# Patient Record
Sex: FEMALE | Race: WHITE | NOT HISPANIC OR LATINO | Employment: FULL TIME | URBAN - METROPOLITAN AREA
[De-identification: names, ages, dates, MRNs, and addresses within clinical notes are randomized per-mention and may not be internally consistent; named-entity substitution may affect disease eponyms.]

---

## 2020-07-31 ENCOUNTER — TELEPHONE (OUTPATIENT)
Dept: OBSTETRICS AND GYNECOLOGY | Facility: CLINIC | Age: 50
End: 2020-07-31

## 2020-07-31 NOTE — TELEPHONE ENCOUNTER
Spoke with patient. Two pt identifiers confirmed. Notified patient of results of normal cologard. Repeat in 3 years. Patient verbalized understanding.

## 2020-07-31 NOTE — TELEPHONE ENCOUNTER
----- Message from Geena Miranda MD sent at 7/31/2020  1:55 PM CDT -----  Please let patient brodiewo her cologard is negative so she is good for 3 years

## 2020-11-19 ENCOUNTER — PROCEDURE VISIT (OUTPATIENT)
Dept: OBSTETRICS AND GYNECOLOGY | Facility: CLINIC | Age: 50
End: 2020-11-19
Payer: COMMERCIAL

## 2020-11-19 ENCOUNTER — OFFICE VISIT (OUTPATIENT)
Dept: OBSTETRICS AND GYNECOLOGY | Facility: CLINIC | Age: 50
End: 2020-11-19
Payer: COMMERCIAL

## 2020-11-19 VITALS
DIASTOLIC BLOOD PRESSURE: 66 MMHG | WEIGHT: 134.19 LBS | HEART RATE: 77 BPM | HEIGHT: 64 IN | BODY MASS INDEX: 22.91 KG/M2 | SYSTOLIC BLOOD PRESSURE: 109 MMHG

## 2020-11-19 DIAGNOSIS — Z12.31 BREAST CANCER SCREENING BY MAMMOGRAM: ICD-10-CM

## 2020-11-19 DIAGNOSIS — R39.15 URINARY URGENCY: ICD-10-CM

## 2020-11-19 DIAGNOSIS — N95.0 POSTMENOPAUSAL BLEEDING: ICD-10-CM

## 2020-11-19 DIAGNOSIS — Z01.419 ENCOUNTER FOR WELL WOMAN EXAM WITH ROUTINE GYNECOLOGICAL EXAM: Primary | ICD-10-CM

## 2020-11-19 DIAGNOSIS — N95.0 POST-MENOPAUSAL BLEEDING: Primary | ICD-10-CM

## 2020-11-19 DIAGNOSIS — N39.3 STRESS INCONTINENCE: ICD-10-CM

## 2020-11-19 PROBLEM — F90.9 ADULT ATTENTION DEFICIT HYPERACTIVITY DISORDER: Status: ACTIVE | Noted: 2017-10-12

## 2020-11-19 PROBLEM — F32.A DEPRESSIVE DISORDER: Status: ACTIVE | Noted: 2017-10-12

## 2020-11-19 PROBLEM — B96.89 DISORDER ASSOCIATED WITH HELICOBACTER SPECIES: Status: ACTIVE | Noted: 2017-10-12

## 2020-11-19 PROCEDURE — 99396 PR PREVENTIVE VISIT,EST,40-64: ICD-10-PCS | Mod: 25,S$GLB,ICN, | Performed by: OBSTETRICS & GYNECOLOGY

## 2020-11-19 PROCEDURE — 99213 PR OFFICE/OUTPT VISIT, EST, LEVL III, 20-29 MIN: ICD-10-PCS | Mod: 25,S$GLB,ICN, | Performed by: OBSTETRICS & GYNECOLOGY

## 2020-11-19 PROCEDURE — 3008F PR BODY MASS INDEX (BMI) DOCUMENTED: ICD-10-PCS | Mod: CPTII,S$GLB,ICN, | Performed by: OBSTETRICS & GYNECOLOGY

## 2020-11-19 PROCEDURE — 99213 OFFICE O/P EST LOW 20 MIN: CPT | Mod: 25,S$GLB,ICN, | Performed by: OBSTETRICS & GYNECOLOGY

## 2020-11-19 PROCEDURE — 1126F AMNT PAIN NOTED NONE PRSNT: CPT | Mod: S$GLB,ICN,, | Performed by: OBSTETRICS & GYNECOLOGY

## 2020-11-19 PROCEDURE — 99396 PREV VISIT EST AGE 40-64: CPT | Mod: 25,S$GLB,ICN, | Performed by: OBSTETRICS & GYNECOLOGY

## 2020-11-19 PROCEDURE — 76830 TRANSVAGINAL US NON-OB: CPT | Mod: S$GLB,,, | Performed by: OBSTETRICS & GYNECOLOGY

## 2020-11-19 PROCEDURE — 3008F BODY MASS INDEX DOCD: CPT | Mod: CPTII,S$GLB,ICN, | Performed by: OBSTETRICS & GYNECOLOGY

## 2020-11-19 PROCEDURE — 76830 PR  ECHOGRAPHY,TRANSVAGINAL: ICD-10-PCS | Mod: S$GLB,,, | Performed by: OBSTETRICS & GYNECOLOGY

## 2020-11-19 PROCEDURE — 1126F PR PAIN SEVERITY QUANTIFIED, NO PAIN PRESENT: ICD-10-PCS | Mod: S$GLB,ICN,, | Performed by: OBSTETRICS & GYNECOLOGY

## 2020-11-19 RX ORDER — PREDNISONE 10 MG/1
TABLET ORAL
COMMUNITY
End: 2020-11-19

## 2020-11-19 RX ORDER — DULOXETIN HYDROCHLORIDE 60 MG/1
CAPSULE, DELAYED RELEASE ORAL
COMMUNITY
End: 2020-11-19

## 2020-11-19 RX ORDER — LISDEXAMFETAMINE DIMESYLATE 40 MG/1
CAPSULE ORAL
COMMUNITY

## 2020-11-19 RX ORDER — DIAZEPAM 5 MG/1
TABLET ORAL
COMMUNITY

## 2020-11-19 NOTE — PROGRESS NOTES
CC: Well Woman (menopausal)    HPI:  Patient is a 50 y.o.    who presents for her well woman exam today.  History reviewed with patient and changes noted.    Patient also would like to discuss some other concerns she has at her wellness visit today. (see problem not below)    Past Medical History:   Diagnosis Date    Abnormal Pap smear of cervix     Atrophic vaginitis     COVID-19     2020    Elevated liver function tests     Fibromyalgia     HSV-2 infection     Hyperlipidemia, mild     Menopausal state     MARGE (stress urinary incontinence, female)      only with coughing and sneezing and small amts       Past Surgical History:   Procedure Laterality Date     SECTION      TUBAL LIGATION         Social History     Tobacco Use    Smoking status: Never Smoker   Substance Use Topics    Alcohol use: Yes     Alcohol/week: 1.0 standard drinks     Types: 1 Glasses of wine per week     Comment: 5 x week    Drug use: Never       Family History   Problem Relation Age of Onset    Heart disease Father        Review of patient's allergies indicates:  No Known Allergies      Current Outpatient Medications:     diazePAM (VALIUM) 5 MG tablet, diazepam 5 mg tablet  Take 1 tablet twice a day by oral route as needed., Disp: , Rfl:     lisdexamfetamine (VYVANSE) 40 MG Cap, Vyvanse 40 mg capsule  Take 1 capsule every day by oral route as directed for 30 days., Disp: , Rfl:     OB History        3    Para   2    Term   2            AB   1    Living   2       SAB   1    TAB        Ectopic        Multiple        Live Births   2                  GYN HX:    HX ABNL PAPS:  yes- yrs ago but normal since    HX OF STDS:  genital herpes    MENOPAUSAL SINCE:     HRT USE: never used    HEALTH MAINTENANCE:  (PCP-Rafael Pichardo MD)    LAST ANNUAL/ PAP:   2019       LAST PAP:  neg    LAST MMG:  2019  normal at [facility]     LAST LABS: in the last few months with pcp    LAST  "COLON: 2019 neg cologard q 3 yrs     ROS:  Review of Systems   Constitutional: Negative for activity change, appetite change, chills, fatigue, fever and unexpected weight change.   Respiratory: Negative for cough and shortness of breath.    Cardiovascular: Negative for chest pain and leg swelling.   Gastrointestinal: Negative for abdominal pain, bloating, blood in stool, constipation, diarrhea, nausea and vomiting.   Endocrine: Negative for hair loss and hot flashes.   Genitourinary: Positive for bladder incontinence (mild stress), urgency, vaginal bleeding, postcoital bleeding, postmenopausal bleeding and vaginal dryness. Negative for decreased libido, dyspareunia, dysuria, flank pain, frequency, hematuria, hot flashes, pelvic pain, vaginal discharge, urinary incontinence and vaginal odor.   Musculoskeletal: Negative for arthralgias and joint swelling.   Integumentary:  Negative for rash, acne, mole/lesion, breast mass, nipple discharge, breast skin changes and breast tenderness.   Neurological: Negative for headaches.   Psychiatric/Behavioral: Negative for depression and sleep disturbance. The patient is not nervous/anxious.    Breast: Negative for asymmetry, lump, mass, nipple discharge, skin changes and tenderness      VITALS:  No LMP recorded. Patient is postmenopausal.  Vitals:    11/19/20 1031   BP: 109/66   Pulse: 77   Weight: 60.9 kg (134 lb 3.2 oz)   Height: 5' 4" (1.626 m)     Body mass index is 23.04 kg/m².     PHYSICAL EXAM:  Physical Exam:   Constitutional: She is oriented to person, place, and time. She appears well-developed and well-nourished. She is cooperative. No distress.    HENT:   Head: Normocephalic and atraumatic.     Neck: No thyroid mass present.    Cardiovascular: Normal rate.     Pulmonary/Chest: Effort normal. No respiratory distress. Right breast exhibits no inverted nipple, no mass, no nipple discharge, no skin change, no tenderness and no swelling. Left breast exhibits no inverted " nipple, no mass, no nipple discharge, no skin change, no tenderness and no swelling. Breasts are symmetrical.        Abdominal: Soft. Normal appearance. She exhibits no distension. There is no abdominal tenderness.     Genitourinary:    Vagina and uterus normal.      Pelvic exam was performed with patient supine.   There is no rash, tenderness, lesion or injury on the right labia. There is no rash, tenderness, lesion or injury on the left labia. Uterus is not enlarged, not fixed, not tender and not experiencing uterine prolapse. Cervix is normal. Right adnexum displays no mass, no tenderness and no fullness. Left adnexum displays no mass, no tenderness and no fullness. No erythema, tenderness, bleeding, rectocele, cystocele or unspecified prolapse of vaginal walls in the vagina. Labial bartholins normal.Cervix exhibits no motion tenderness, no discharge and no friability. negative for vaginal discharge          Musculoskeletal: Moves all extremeties. No edema.       Neurological: She is alert and oriented to person, place, and time.    Skin: Skin is warm and dry. No lesion and no rash noted.    Psychiatric: She has a normal mood and affect. Her speech is normal and behavior is normal. Judgment and thought content normal.     *female chaperone present for exam    ASSESSMENT and PLAN:  Encounter for well woman exam with routine gynecological exam  -     Liquid-based pap smear, screening    Breast cancer screening by mammogram  -     Mammo Digital Screening Bilat w/ [facility]; Future; Expected date: 12/19/2020             FOLLOWUP:  Follow up in about 1 year (around 11/19/2021) for wwe.     COUNSELING:  Patient was counseled today on A.C.S. Pap guidelines and recommendations for yearly pelvic exam, monthly self breast exams, annual mammograms, and screening colonoscopy starting at age 50. Encouraged patient to see her PCP for other health maintenance.      PROBLEM VISIT:    PROBLEM HPI:      Pt had an episode of bleeding  during sex and is thinkng it is possibly vag dryness-had tried intrarosa for a month last year but didn't help that much and may need to try something else. No obvious injury and resolved in a day or so.  Discussed possible dryness vs pmb . No pain or bleeding    Still with mild monique, not ready for ref to urology but feels like she has had more urgency recently and a heavy sensation in her bladder-hasnt tried checking her ua and would like to do so to rule out any infection    PROBLEM ROS:   Review of Systems   Constitutional: Negative for activity change, appetite change, chills, fatigue, fever and unexpected weight change.   Respiratory: Negative for cough and shortness of breath.    Cardiovascular: Negative for chest pain and leg swelling.   Gastrointestinal: Negative for abdominal pain, bloating, blood in stool, constipation, diarrhea, nausea and vomiting.   Endocrine: Negative for hair loss and hot flashes.   Genitourinary: Positive for bladder incontinence (mild stress), urgency, vaginal bleeding, postcoital bleeding, postmenopausal bleeding and vaginal dryness. Negative for decreased libido, dyspareunia, dysuria, flank pain, frequency, hematuria, hot flashes, pelvic pain, vaginal discharge, urinary incontinence and vaginal odor.   Musculoskeletal: Negative for arthralgias and joint swelling.   Integumentary:  Negative for rash, acne, mole/lesion, breast mass, nipple discharge, breast skin changes and breast tenderness.   Neurological: Negative for headaches.   Psychiatric/Behavioral: Negative for depression and sleep disturbance. The patient is not nervous/anxious.    Breast: Negative for asymmetry, lump, mass, nipple discharge, skin changes and tenderness        PROBLEM PHYSEXAM:    Physical Exam:   Constitutional: She is oriented to person, place, and time. She appears well-developed and well-nourished. She is cooperative. No distress.    HENT:   Head: Normocephalic and atraumatic.     Neck: No thyroid mass  present.    Cardiovascular: Normal rate.     Pulmonary/Chest: Effort normal. No respiratory distress. Right breast exhibits no inverted nipple, no mass, no nipple discharge, no skin change, no tenderness and no swelling. Left breast exhibits no inverted nipple, no mass, no nipple discharge, no skin change, no tenderness and no swelling. Breasts are symmetrical.        Abdominal: Soft. Normal appearance. She exhibits no distension. There is no abdominal tenderness.     Genitourinary:    Vagina and uterus normal.      Pelvic exam was performed with patient supine.   There is no rash, tenderness, lesion or injury on the right labia. There is no rash, tenderness, lesion or injury on the left labia. Uterus is not enlarged, not fixed, not tender and not experiencing uterine prolapse. Cervix is normal. Right adnexum displays no mass, no tenderness and no fullness. Left adnexum displays no mass, no tenderness and no fullness. No erythema, tenderness, bleeding, rectocele, cystocele or unspecified prolapse of vaginal walls in the vagina. Labial bartholins normal.Cervix exhibits no motion tenderness, no discharge and no friability. negative for vaginal discharge          Musculoskeletal: Moves all extremeties. No edema.       Neurological: She is alert and oriented to person, place, and time.    Skin: Skin is warm and dry. No lesion and no rash noted.    Psychiatric: She has a normal mood and affect. Her speech is normal and behavior is normal. Judgment and thought content normal.       PROBLEM ASSESMENT and PLAN:  Postmenopausal bleeding  -     US OB/GYN Procedure ([facility]); Future; Expected date: 11/19/2020    Urinary urgency  -     POCT urine dipstick without microscope  -     Urinalysis  -     Urine culture    Stress incontinence    -stable for now    Atrophic vaginitis   -will consider estrogen creams if u/s reassuring that the bleeding only from atrophic changes    *Total time spent: 30 min. 50 % or more was spent on  counseling about diagnosis, treatment options, and coordination of care.

## 2020-11-20 LAB
AMORPH URATE CRY URNS QL MICRO: NEGATIVE
BACTERIA #/AREA URNS HPF: ABNORMAL /[HPF]
BILIRUB UR QL STRIP: NEGATIVE
CALCIUM OXALATE: ABNORMAL
CLARITY UR: CLEAR
COLOR UR: YELLOW
EPITHELIAL CELLS: ABNORMAL
GLUCOSE (UA): NEGATIVE MG/DL
KETONES UR QL STRIP: NEGATIVE MG/DL
LEUKOCYTE ESTERASE UR QL STRIP: NEGATIVE
MUCOUS THREADS URNS QL MICRO: NEGATIVE
NITRITE UR QL STRIP: NEGATIVE
OCCULT BLOOD: NEGATIVE
PH, URINE: 7 (ref 5–7.5)
PROT UR QL STRIP: NEGATIVE MG/DL
RBC/HPF: NEGATIVE
SP GR UR STRIP: 1.01 (ref 1–1.03)
UROBILINOGEN, URINE: NORMAL E.U./DL (ref 0–1)
WBC/HPF: ABNORMAL

## 2020-11-21 DIAGNOSIS — N95.2 ATROPHIC VAGINITIS: Primary | ICD-10-CM

## 2020-11-21 PROBLEM — R39.15 URINARY URGENCY: Status: ACTIVE | Noted: 2020-11-21

## 2020-11-21 PROBLEM — N95.0 POSTMENOPAUSAL BLEEDING: Status: ACTIVE | Noted: 2020-11-21

## 2020-11-21 PROBLEM — N39.3 STRESS INCONTINENCE: Status: ACTIVE | Noted: 2020-11-21

## 2020-11-21 RX ORDER — ESTRADIOL 0.1 MG/G
1 CREAM VAGINAL
Qty: 42.5 G | Refills: 4 | Status: SHIPPED | OUTPATIENT
Start: 2020-11-23 | End: 2023-06-29 | Stop reason: CLARIF

## 2020-11-21 NOTE — PROGRESS NOTES
Please call patient and let her know that her endometrial lining is very thin (normal for menopause) so I see no cause for concern there. We can assume that the bleeding she is having is all from the vaginal dryness she is having. I am sending out some estrace cream to her pharmacy and I recommend using it  Every night for 2 weeks and then decrease to about 2x per week after for maintenance. If she has any questions, let me know

## 2020-11-22 LAB — URINE CULTURE, ROUTINE: NORMAL

## 2020-11-22 NOTE — PROGRESS NOTES
Please let pt know that her urine didn't really grow any one bacteria.  I expect this is just skin bacteria contaminants but a lot of her symptoms, including bladder irritation, can be explained by her vaginal dryness. Once I saw her u/s and it showed a thin ems, I sent out a rx for estrace vaginal cream to uses qhs x 2 weeks and then back to 2 times a week and that should help all of this.

## 2020-11-23 ENCOUNTER — TELEPHONE (OUTPATIENT)
Dept: OBSTETRICS AND GYNECOLOGY | Facility: CLINIC | Age: 50
End: 2020-11-23

## 2020-11-23 NOTE — TELEPHONE ENCOUNTER
----- Message from Geena Miranda MD sent at 11/21/2020  4:16 PM CST -----  Please call patient and let her know that her endometrial lining is very thin (normal for menopause) so I see no cause for concern there. We can assume that the bleeding she is having is all from the vaginal dryness she is having. I am sending out some estrace cream to her pharmacy and I recommend using it  Every night for 2 weeks and then decrease to about 2x per week after for maintenance. If she has any questions, let me know

## 2020-11-24 NOTE — TELEPHONE ENCOUNTER
Spoke with patient. Two pt identifiers confirmed. Notified patient of results of u/s and urine culture. Also nl pap smear.. Patient verbalized understanding.

## 2023-06-29 ENCOUNTER — OFFICE VISIT (OUTPATIENT)
Dept: OBSTETRICS AND GYNECOLOGY | Facility: CLINIC | Age: 53
End: 2023-06-29
Payer: COMMERCIAL

## 2023-06-29 VITALS
SYSTOLIC BLOOD PRESSURE: 133 MMHG | DIASTOLIC BLOOD PRESSURE: 79 MMHG | BODY MASS INDEX: 23.28 KG/M2 | HEART RATE: 60 BPM | WEIGHT: 135.63 LBS

## 2023-06-29 DIAGNOSIS — Z01.419 WELL WOMAN EXAM WITH ROUTINE GYNECOLOGICAL EXAM: Primary | ICD-10-CM

## 2023-06-29 DIAGNOSIS — Z12.31 ENCOUNTER FOR MAMMOGRAM TO ESTABLISH BASELINE MAMMOGRAM: ICD-10-CM

## 2023-06-29 DIAGNOSIS — N95.1 VAGINAL DRYNESS, MENOPAUSAL: ICD-10-CM

## 2023-06-29 PROCEDURE — 3008F BODY MASS INDEX DOCD: CPT | Mod: CPTII,S$GLB,, | Performed by: STUDENT IN AN ORGANIZED HEALTH CARE EDUCATION/TRAINING PROGRAM

## 2023-06-29 PROCEDURE — 3078F DIAST BP <80 MM HG: CPT | Mod: CPTII,S$GLB,, | Performed by: STUDENT IN AN ORGANIZED HEALTH CARE EDUCATION/TRAINING PROGRAM

## 2023-06-29 PROCEDURE — 3075F PR MOST RECENT SYSTOLIC BLOOD PRESS GE 130-139MM HG: ICD-10-PCS | Mod: CPTII,S$GLB,, | Performed by: STUDENT IN AN ORGANIZED HEALTH CARE EDUCATION/TRAINING PROGRAM

## 2023-06-29 PROCEDURE — 1159F MED LIST DOCD IN RCRD: CPT | Mod: CPTII,S$GLB,, | Performed by: STUDENT IN AN ORGANIZED HEALTH CARE EDUCATION/TRAINING PROGRAM

## 2023-06-29 PROCEDURE — 99396 PR PREVENTIVE VISIT,EST,40-64: ICD-10-PCS | Mod: S$GLB,,, | Performed by: STUDENT IN AN ORGANIZED HEALTH CARE EDUCATION/TRAINING PROGRAM

## 2023-06-29 PROCEDURE — 3075F SYST BP GE 130 - 139MM HG: CPT | Mod: CPTII,S$GLB,, | Performed by: STUDENT IN AN ORGANIZED HEALTH CARE EDUCATION/TRAINING PROGRAM

## 2023-06-29 PROCEDURE — 3008F PR BODY MASS INDEX (BMI) DOCUMENTED: ICD-10-PCS | Mod: CPTII,S$GLB,, | Performed by: STUDENT IN AN ORGANIZED HEALTH CARE EDUCATION/TRAINING PROGRAM

## 2023-06-29 PROCEDURE — 99396 PREV VISIT EST AGE 40-64: CPT | Mod: S$GLB,,, | Performed by: STUDENT IN AN ORGANIZED HEALTH CARE EDUCATION/TRAINING PROGRAM

## 2023-06-29 PROCEDURE — 1160F PR REVIEW ALL MEDS BY PRESCRIBER/CLIN PHARMACIST DOCUMENTED: ICD-10-PCS | Mod: CPTII,S$GLB,, | Performed by: STUDENT IN AN ORGANIZED HEALTH CARE EDUCATION/TRAINING PROGRAM

## 2023-06-29 PROCEDURE — 1159F PR MEDICATION LIST DOCUMENTED IN MEDICAL RECORD: ICD-10-PCS | Mod: CPTII,S$GLB,, | Performed by: STUDENT IN AN ORGANIZED HEALTH CARE EDUCATION/TRAINING PROGRAM

## 2023-06-29 PROCEDURE — 3078F PR MOST RECENT DIASTOLIC BLOOD PRESSURE < 80 MM HG: ICD-10-PCS | Mod: CPTII,S$GLB,, | Performed by: STUDENT IN AN ORGANIZED HEALTH CARE EDUCATION/TRAINING PROGRAM

## 2023-06-29 PROCEDURE — 1160F RVW MEDS BY RX/DR IN RCRD: CPT | Mod: CPTII,S$GLB,, | Performed by: STUDENT IN AN ORGANIZED HEALTH CARE EDUCATION/TRAINING PROGRAM

## 2023-06-29 NOTE — PROGRESS NOTES
Chief Complaint: Annual Exam    HPI: 52 y.o.  here for Annual Exam.  Patient doing well today.  She reports menopausal, reports some vaginal dryness, has been tried on vaginal cream in the past with minimal improvement.  She does have a history of postmenopausal bleeding that was attributed to vaginal dryness, endometrial stripe was within normal limits.  Denies any further episodes bleeding.  She is a thin Pap smear, around today.  She has no other complaints today.    Review of Systems   Constitutional:  Negative for chills and fever.   HENT:  Negative for congestion and sore throat.    Respiratory:  Negative for cough and shortness of breath.    Cardiovascular:  Negative for chest pain and palpitations.   Gastrointestinal:  Negative for abdominal pain, nausea and vomiting.   Genitourinary:  Negative for dysuria, frequency and urgency.   Musculoskeletal:  Negative for back pain.   Skin:  Negative for itching and rash.   Neurological:  Negative for headaches.   All other systems reviewed and are negative.    Past Medical History:   Diagnosis Date    Abnormal Pap smear of cervix     Atrophic vaginitis     COVID-2020    Elevated liver function tests     Fibromyalgia     HSV-2 infection     Hyperlipidemia, mild     Menopausal state     MARGE (stress urinary incontinence, female)      only with coughing and sneezing and small amts     Past Surgical History:   Procedure Laterality Date     SECTION      TUBAL LIGATION       Past OB History:   Past Gyn History: Denies prior abnormal pap smears, denies STD's  Contraception History: post menopausal status  Social History: + etoh use, denies t/d  Family History denies breast, colon, ovarian, or uterine cancer  Allergies: NKDA  Current Outpatient Medications   Medication Instructions    diazePAM (VALIUM) 5 MG tablet diazepam 5 mg tablet   Take 1 tablet twice a day by oral route as needed.    estradioL (ESTRACE) 1 g, Vaginal, Twice weekly     lisdexamfetamine (VYVANSE) 40 MG Cap Vyvanse 40 mg capsule   Take 1 capsule every day by oral route as directed for 30 days.     Physical Exam:  Vitals:    23 0907   BP: 133/79   Pulse: 60     Body mass index is 23.28 kg/m².  Physical Exam  Vitals reviewed. Exam conducted with a chaperone present.   Constitutional:       General: She is not in acute distress.     Appearance: Normal appearance. She is normal weight.   HENT:      Head: Normocephalic and atraumatic.   Eyes:      Extraocular Movements: Extraocular movements intact.      Pupils: Pupils are equal, round, and reactive to light.   Pulmonary:      Effort: Pulmonary effort is normal. No respiratory distress.   Chest:   Breasts:     Breasts are symmetrical.      Right: No inverted nipple, mass, nipple discharge, skin change or tenderness.      Left: No inverted nipple, mass, nipple discharge, skin change or tenderness.   Abdominal:      General: There is no distension.      Palpations: Abdomen is soft.      Tenderness: There is no abdominal tenderness.   Genitourinary:     Comments: Normal external female genitalia, no masses or lesions, vagina pink with rugated, no vaginal bleeding or discharge, not friable cervix  Musculoskeletal:         General: No swelling or tenderness. Normal range of motion.      Cervical back: Normal range of motion and neck supple.   Lymphadenopathy:      Upper Body:      Right upper body: No supraclavicular or axillary adenopathy.      Left upper body: No supraclavicular or axillary adenopathy.   Skin:     General: Skin is warm and dry.   Neurological:      General: No focal deficit present.      Mental Status: She is alert and oriented to person, place, and time.          ASSESSMENT:   Patient is a 52 y.o.  who presents for annual exam     Patient Active Problem List   Diagnosis    Adult attention deficit hyperactivity disorder    Depressive disorder    Disorder associated with Helicobacter species    HSV-2 infection     Urinary urgency    Postmenopausal bleeding    Stress incontinence       PLAN:  Will give premarin vaginally for vaginal dryness  Pap up-to-date, will need 2 years  Mammogram ordered today  TSH, Lipid profile, Diabetes screening followed by PCP  Colon cancer screening - utd  Counseling on self-breast exams, diet, and exercise.  RTC in 2 months

## 2023-08-30 ENCOUNTER — TELEPHONE (OUTPATIENT)
Dept: OBSTETRICS AND GYNECOLOGY | Facility: CLINIC | Age: 53
End: 2023-08-30
Payer: COMMERCIAL

## 2023-08-30 NOTE — TELEPHONE ENCOUNTER
Orders sent to the fax number provided.    ----- Message from Brandy Neumann sent at 8/30/2023 12:08 PM CDT -----  Patient is requesting orders for mammogram be sent to [facility] on [address] Please fax to [phone].              Thanks  christal

## 2024-04-04 RX ORDER — CONJUGATED ESTROGENS 0.62 MG/G
CREAM VAGINAL
Qty: 30 G | Refills: 0 | Status: SHIPPED | OUTPATIENT
Start: 2024-04-04

## 2025-07-10 ENCOUNTER — TELEPHONE (OUTPATIENT)
Dept: OBSTETRICS AND GYNECOLOGY | Facility: CLINIC | Age: 55
End: 2025-07-10
Payer: COMMERCIAL